# Patient Record
Sex: FEMALE | Race: WHITE
[De-identification: names, ages, dates, MRNs, and addresses within clinical notes are randomized per-mention and may not be internally consistent; named-entity substitution may affect disease eponyms.]

---

## 2018-04-12 ENCOUNTER — HOSPITAL ENCOUNTER (EMERGENCY)
Dept: HOSPITAL 54 - ER | Age: 83
Discharge: ANOTHER HOSPITAL | End: 2018-04-12
Payer: COMMERCIAL

## 2018-04-12 VITALS — HEIGHT: 60 IN | BODY MASS INDEX: 24.15 KG/M2 | WEIGHT: 123 LBS

## 2018-04-12 VITALS — SYSTOLIC BLOOD PRESSURE: 142 MMHG | DIASTOLIC BLOOD PRESSURE: 83 MMHG

## 2018-04-12 DIAGNOSIS — I48.91: Primary | ICD-10-CM

## 2018-04-12 DIAGNOSIS — I10: ICD-10-CM

## 2018-04-12 DIAGNOSIS — Z88.1: ICD-10-CM

## 2018-04-12 DIAGNOSIS — I70.0: ICD-10-CM

## 2018-04-12 DIAGNOSIS — Z88.5: ICD-10-CM

## 2018-04-12 DIAGNOSIS — Z79.01: ICD-10-CM

## 2018-04-12 DIAGNOSIS — Z88.2: ICD-10-CM

## 2018-04-12 LAB
APTT PPP: 46 SEC (ref 23–34)
BASOPHILS # BLD AUTO: 0.2 /CMM (ref 0–0.2)
BASOPHILS NFR BLD AUTO: 1.8 % (ref 0–2)
BUN SERPL-MCNC: 17 MG/DL (ref 7–18)
CALCIUM SERPL-MCNC: 10.3 MG/DL (ref 8.5–10.1)
CHLORIDE SERPL-SCNC: 100 MMOL/L (ref 98–107)
CO2 SERPL-SCNC: 29 MMOL/L (ref 21–32)
CREAT SERPL-MCNC: 0.7 MG/DL (ref 0.6–1.3)
EOSINOPHIL NFR BLD AUTO: 2.2 % (ref 0–6)
GLUCOSE SERPL-MCNC: 138 MG/DL (ref 74–106)
HCT VFR BLD AUTO: 45 % (ref 33–45)
HGB BLD-MCNC: 15.3 G/DL (ref 11.5–14.8)
INR PPP: 1.11 (ref 0.85–1.15)
LYMPHOCYTES NFR BLD AUTO: 19.6 % (ref 20–44)
LYMPHOCYTES NFR BLD AUTO: 2.4 /CMM (ref 0.8–4.8)
MCHC RBC AUTO-ENTMCNC: 34 G/DL (ref 31–36)
MCV RBC AUTO: 87 FL (ref 82–100)
MONOCYTES NFR BLD AUTO: 0.6 /CMM (ref 0.1–1.3)
MONOCYTES NFR BLD AUTO: 5.2 % (ref 2–12)
NEUTROPHILS # BLD AUTO: 8.6 /CMM (ref 1.8–8.9)
NEUTROPHILS NFR BLD AUTO: 71.2 % (ref 43–81)
PLATELET # BLD AUTO: 309 /CMM (ref 150–450)
POTASSIUM SERPL-SCNC: 4.4 MMOL/L (ref 3.5–5.1)
RBC # BLD AUTO: 5.22 MIL/UL (ref 4–5.2)
RDW COEFFICIENT OF VARIATION: 12.2 (ref 11.5–15)
SODIUM SERPL-SCNC: 137 MMOL/L (ref 136–145)
TROPONIN I SERPL-MCNC: < 0.017 NG/ML (ref 0–0.06)
WBC NRBC COR # BLD AUTO: 12.1 K/UL (ref 4.3–11)

## 2018-04-12 PROCEDURE — 80048 BASIC METABOLIC PNL TOTAL CA: CPT

## 2018-04-12 PROCEDURE — 36415 COLL VENOUS BLD VENIPUNCTURE: CPT

## 2018-04-12 PROCEDURE — 85730 THROMBOPLASTIN TIME PARTIAL: CPT

## 2018-04-12 PROCEDURE — 84484 ASSAY OF TROPONIN QUANT: CPT

## 2018-04-12 PROCEDURE — 93005 ELECTROCARDIOGRAM TRACING: CPT

## 2018-04-12 PROCEDURE — 99291 CRITICAL CARE FIRST HOUR: CPT

## 2018-04-12 PROCEDURE — A4606 OXYGEN PROBE USED W OXIMETER: HCPCS

## 2018-04-12 PROCEDURE — 85025 COMPLETE CBC W/AUTO DIFF WBC: CPT

## 2018-04-12 PROCEDURE — Z7610: HCPCS

## 2018-04-12 PROCEDURE — 96374 THER/PROPH/DIAG INJ IV PUSH: CPT

## 2018-04-12 PROCEDURE — 71045 X-RAY EXAM CHEST 1 VIEW: CPT

## 2018-04-12 NOTE — NUR
[facility] TOOK OVER CARE. PT VSS. PT IV INTACT AND PATENT. NO S/S INFECTION 
OR INFILTRATION NOTED. PT TRANSFERRED TO ER [facility] VIA GURNEY. 
PT AWARE OF TRANSFER AND EMTS WITH TRANSFER PACKET.

## 2018-04-12 NOTE — NUR
BIBRA 89 C/O CHEST PALPIATIONS SINCE 1600. DENIES ANY CHEST PAIN. RECENTLY 
DISCHARGED FROM [place] FOR CHEST PAIN.  PATIENT IS A/OX 4. BREATHING EVEN 
ANDUNLABORED. NO SOB, NAD. PATIENT IS TACHYCARDIC, 120'S-140'S. SAFETY AND 
COMFORT MEASURSE IN PLACE, MD AT BEDSIDE FOR EVAL.

## 2018-04-12 NOTE — NUR
Patient going to [facility] ER

Call for report: [phone]

Accepted by YONAS Warren

Ambulance ETA: [id number]